# Patient Record
Sex: MALE | Race: WHITE | Employment: FULL TIME | URBAN - NONMETROPOLITAN AREA
[De-identification: names, ages, dates, MRNs, and addresses within clinical notes are randomized per-mention and may not be internally consistent; named-entity substitution may affect disease eponyms.]

---

## 2019-07-24 ENCOUNTER — TELEPHONE (OUTPATIENT)
Dept: OTOLARYNGOLOGY | Age: 54
End: 2019-07-24

## 2019-09-17 ENCOUNTER — OFFICE VISIT (OUTPATIENT)
Dept: OTOLARYNGOLOGY | Facility: CLINIC | Age: 54
End: 2019-09-17

## 2019-09-17 ENCOUNTER — PROCEDURE VISIT (OUTPATIENT)
Dept: OTOLARYNGOLOGY | Facility: CLINIC | Age: 54
End: 2019-09-17

## 2019-09-17 VITALS
HEART RATE: 68 BPM | RESPIRATION RATE: 20 BRPM | BODY MASS INDEX: 32.97 KG/M2 | SYSTOLIC BLOOD PRESSURE: 128 MMHG | OXYGEN SATURATION: 100 % | HEIGHT: 78 IN | TEMPERATURE: 98.5 F | WEIGHT: 285 LBS | DIASTOLIC BLOOD PRESSURE: 68 MMHG

## 2019-09-17 DIAGNOSIS — H90.A22 SENSORINEURAL HEARING LOSS (SNHL) OF LEFT EAR WITH RESTRICTED HEARING OF RIGHT EAR: Primary | ICD-10-CM

## 2019-09-17 DIAGNOSIS — H93.12 TINNITUS, LEFT: ICD-10-CM

## 2019-09-17 DIAGNOSIS — H83.3X2 ACOUSTIC TRAUMA OF LEFT EAR: ICD-10-CM

## 2019-09-17 PROCEDURE — 92570 ACOUSTIC IMMITANCE TESTING: CPT | Performed by: AUDIOLOGIST-HEARING AID FITTER

## 2019-09-17 PROCEDURE — 99203 OFFICE O/P NEW LOW 30 MIN: CPT | Performed by: NURSE PRACTITIONER

## 2019-09-17 PROCEDURE — 92557 COMPREHENSIVE HEARING TEST: CPT | Performed by: AUDIOLOGIST-HEARING AID FITTER

## 2019-09-17 RX ORDER — ERTUGLIFLOZIN 15 MG/1
15 TABLET, FILM COATED ORAL DAILY
Refills: 3 | COMMUNITY
Start: 2019-09-06

## 2019-09-17 RX ORDER — TADALAFIL 5 MG/1
TABLET ORAL
COMMUNITY
Start: 2015-05-28

## 2019-09-17 RX ORDER — PHENTERMINE HYDROCHLORIDE 30 MG/1
30 CAPSULE ORAL DAILY
Refills: 0 | COMMUNITY
Start: 2019-07-29

## 2019-09-17 RX ORDER — SITAGLIPTIN AND METFORMIN HYDROCHLORIDE 500; 50 MG/1; MG/1
1 TABLET, FILM COATED ORAL 2 TIMES DAILY WITH MEALS
Refills: 2 | COMMUNITY
Start: 2019-07-09

## 2019-09-17 RX ORDER — TOPIRAMATE 100 MG/1
1 CAPSULE, EXTENDED RELEASE ORAL
Refills: 3 | COMMUNITY
Start: 2019-09-06

## 2019-10-01 PROBLEM — H90.A22 SENSORINEURAL HEARING LOSS (SNHL) OF LEFT EAR WITH RESTRICTED HEARING OF RIGHT EAR: Status: ACTIVE | Noted: 2019-10-01

## 2019-10-01 PROBLEM — H83.3X2 ACOUSTIC TRAUMA OF LEFT EAR: Status: ACTIVE | Noted: 2019-10-01

## 2019-10-01 PROBLEM — H93.12 TINNITUS, LEFT: Status: ACTIVE | Noted: 2019-10-01

## 2024-06-27 ENCOUNTER — OFFICE VISIT (OUTPATIENT)
Dept: GASTROENTEROLOGY | Facility: CLINIC | Age: 59
End: 2024-06-27
Payer: COMMERCIAL

## 2024-06-27 VITALS
HEART RATE: 75 BPM | WEIGHT: 260 LBS | TEMPERATURE: 97.5 F | DIASTOLIC BLOOD PRESSURE: 74 MMHG | SYSTOLIC BLOOD PRESSURE: 134 MMHG | BODY MASS INDEX: 36.4 KG/M2 | OXYGEN SATURATION: 99 % | HEIGHT: 71 IN

## 2024-06-27 DIAGNOSIS — Z86.010 HX OF COLONIC POLYP: ICD-10-CM

## 2024-06-27 DIAGNOSIS — Z12.11 ENCOUNTER FOR SCREENING FOR MALIGNANT NEOPLASM OF COLON: Primary | ICD-10-CM

## 2024-06-27 RX ORDER — DAPAGLIFLOZIN AND METFORMIN HYDROCHLORIDE 10; 500 MG/1; MG/1
1 TABLET, FILM COATED, EXTENDED RELEASE ORAL DAILY
COMMUNITY

## 2024-06-28 PROBLEM — Z12.11 ENCOUNTER FOR SCREENING FOR MALIGNANT NEOPLASM OF COLON: Status: ACTIVE | Noted: 2024-06-27

## 2024-07-22 ENCOUNTER — ANESTHESIA (OUTPATIENT)
Dept: GASTROENTEROLOGY | Facility: HOSPITAL | Age: 59
End: 2024-07-22
Payer: COMMERCIAL

## 2024-07-22 ENCOUNTER — HOSPITAL ENCOUNTER (OUTPATIENT)
Facility: HOSPITAL | Age: 59
Setting detail: HOSPITAL OUTPATIENT SURGERY
Discharge: HOME | End: 2024-07-22
Attending: INTERNAL MEDICINE | Admitting: INTERNAL MEDICINE
Payer: COMMERCIAL

## 2024-07-22 ENCOUNTER — ANESTHESIA EVENT (OUTPATIENT)
Dept: GASTROENTEROLOGY | Facility: HOSPITAL | Age: 59
End: 2024-07-22
Payer: COMMERCIAL

## 2024-07-22 VITALS
HEIGHT: 71 IN | WEIGHT: 246 LBS | HEART RATE: 81 BPM | OXYGEN SATURATION: 95 % | BODY MASS INDEX: 34.44 KG/M2 | DIASTOLIC BLOOD PRESSURE: 84 MMHG | SYSTOLIC BLOOD PRESSURE: 125 MMHG | TEMPERATURE: 96.8 F | RESPIRATION RATE: 23 BRPM

## 2024-07-22 DIAGNOSIS — Z12.11 ENCOUNTER FOR SCREENING FOR MALIGNANT NEOPLASM OF COLON: ICD-10-CM

## 2024-07-22 LAB — GLUCOSE BLDC GLUCOMTR-MCNC: 147 MG/DL (ref 70–130)

## 2024-07-22 PROCEDURE — 25810000003 SODIUM CHLORIDE 0.9 % SOLUTION: Performed by: ANESTHESIOLOGY

## 2024-07-22 PROCEDURE — 82948 REAGENT STRIP/BLOOD GLUCOSE: CPT

## 2024-07-22 PROCEDURE — 25010000002 PROPOFOL 10 MG/ML EMULSION

## 2024-07-22 PROCEDURE — 88305 TISSUE EXAM BY PATHOLOGIST: CPT | Performed by: INTERNAL MEDICINE

## 2024-07-22 PROCEDURE — 45385 COLONOSCOPY W/LESION REMOVAL: CPT | Performed by: INTERNAL MEDICINE

## 2024-07-22 RX ORDER — ONDANSETRON 2 MG/ML
4 INJECTION INTRAMUSCULAR; INTRAVENOUS ONCE AS NEEDED
Status: DISCONTINUED | OUTPATIENT
Start: 2024-07-22 | End: 2024-07-22 | Stop reason: HOSPADM

## 2024-07-22 RX ORDER — LIDOCAINE HYDROCHLORIDE 10 MG/ML
0.5 INJECTION, SOLUTION EPIDURAL; INFILTRATION; INTRACAUDAL; PERINEURAL ONCE AS NEEDED
Status: CANCELLED | OUTPATIENT
Start: 2024-07-22

## 2024-07-22 RX ORDER — SODIUM CHLORIDE 0.9 % (FLUSH) 0.9 %
10 SYRINGE (ML) INJECTION AS NEEDED
Status: DISCONTINUED | OUTPATIENT
Start: 2024-07-22 | End: 2024-07-22 | Stop reason: HOSPADM

## 2024-07-22 RX ORDER — PROPOFOL 10 MG/ML
VIAL (ML) INTRAVENOUS AS NEEDED
Status: DISCONTINUED | OUTPATIENT
Start: 2024-07-22 | End: 2024-07-22 | Stop reason: SURG

## 2024-07-22 RX ORDER — LIDOCAINE HYDROCHLORIDE 20 MG/ML
INJECTION, SOLUTION EPIDURAL; INFILTRATION; INTRACAUDAL; PERINEURAL AS NEEDED
Status: DISCONTINUED | OUTPATIENT
Start: 2024-07-22 | End: 2024-07-22 | Stop reason: SURG

## 2024-07-22 RX ORDER — SODIUM CHLORIDE 9 MG/ML
500 INJECTION, SOLUTION INTRAVENOUS CONTINUOUS PRN
Status: DISCONTINUED | OUTPATIENT
Start: 2024-07-22 | End: 2024-07-22 | Stop reason: HOSPADM

## 2024-07-22 RX ADMIN — PROPOFOL 250 MG: 10 INJECTION, EMULSION INTRAVENOUS at 12:30

## 2024-07-22 RX ADMIN — SODIUM CHLORIDE 500 ML: 9 INJECTION, SOLUTION INTRAVENOUS at 11:18

## 2024-07-22 RX ADMIN — LIDOCAINE HYDROCHLORIDE 50 MG: 20 INJECTION, SOLUTION EPIDURAL; INFILTRATION; INTRACAUDAL; PERINEURAL at 12:30

## 2024-07-23 LAB
CYTO UR: NORMAL
LAB AP CASE REPORT: NORMAL
Lab: NORMAL
PATH REPORT.FINAL DX SPEC: NORMAL
PATH REPORT.GROSS SPEC: NORMAL

## 2025-07-23 ENCOUNTER — OFFICE VISIT (OUTPATIENT)
Dept: NEUROLOGY | Facility: CLINIC | Age: 60
End: 2025-07-23
Payer: COMMERCIAL

## 2025-07-23 VITALS
WEIGHT: 246 LBS | BODY MASS INDEX: 34.44 KG/M2 | HEART RATE: 90 BPM | HEIGHT: 71 IN | OXYGEN SATURATION: 96 % | DIASTOLIC BLOOD PRESSURE: 74 MMHG | SYSTOLIC BLOOD PRESSURE: 126 MMHG

## 2025-07-23 DIAGNOSIS — G25.0 ESSENTIAL TREMOR: Primary | ICD-10-CM

## 2025-07-23 RX ORDER — PRIMIDONE 50 MG/1
50 TABLET ORAL NIGHTLY
Qty: 30 TABLET | Refills: 1 | Status: SHIPPED | OUTPATIENT
Start: 2025-07-23

## 2025-07-23 RX ORDER — GLIMEPIRIDE 2 MG/1
TABLET ORAL
COMMUNITY
Start: 2025-06-26

## 2025-07-23 RX ORDER — TIRZEPATIDE 5 MG/.5ML
INJECTION, SOLUTION SUBCUTANEOUS
COMMUNITY
Start: 2025-07-05

## 2025-07-23 NOTE — PROGRESS NOTES
Neurology Consult Note     [facility]  [address], [address]  [place] [address]  Phone: [phone]  Fax: [phone]    Referring Provider:   STEPHEN Hurley  Primary Care Provider:  Abhi Abraham APRN    Reason for Consult:  Tremor  Subjective      History of Present Illness  59-year-old male referred to our clinic by STEPHEN Allen for the evaluation of tremor.    Today patient presents by himself.  He is right-hand dominant.  Reports to me a tremor that started in his right hand and gradually progressed to his left hand with an onset about 5 years that has slowly worsened.  Originally he thought it was due to stress from his work environment.  However he changed jobs with less stress and still has the tremor.  He may notice slightly better however.  He can have flares of worsening tremor.  He typically notes his tremor with activity such as picking up objects, eating from a spoon or drinking.  His handwriting has also become affected.  He also states pistols and notes he has trouble with that as well.  He does not consider this tremor disabling however it can be aggravating.    He does endorse a history of head injury in the eighth grade as well as high school.  Both these resulted in a concussion.  He does have a family history of tremors in his sister.  He notes her tremors occur when she is carrying a plate.    He was previously tried on propranolol by his PCP.  He was on 60 mg daily.  He notes this made him feel very sluggish and tired.  He stopped therapy after 3 weeks.  He has not been tried any other medications for this.  He also has not noticed if this improves with alcohol.  Patient Active Problem List   Diagnosis    Sensorineural hearing loss (SNHL) of left ear with restricted hearing of right ear    Tinnitus, left    Acoustic trauma of left ear    Encounter for screening for malignant neoplasm of colon        Past Medical History:   Diagnosis Date    BPH  "(benign prostatic hyperplasia)     Colon polyp     Diabetes mellitus     Essential tremor     Headache, tension-type 2000    HL (hearing loss) 2020    Migraine     Sleep apnea     cpap at hs        Social History     Socioeconomic History    Marital status:    Tobacco Use    Smoking status: Never    Smokeless tobacco: Current     Types: Snuff   Vaping Use    Vaping status: Never Used   Substance and Sexual Activity    Alcohol use: Not Currently     Alcohol/week: 1.0 standard drink of alcohol     Types: 1 Cans of beer per week     Comment: occ    Drug use: Never    Sexual activity: Yes     Partners: Female     Birth control/protection: Vasectomy        No Known Allergies       Current Outpatient Medications:     dapagliflozin-metformin HCl ER (Xigduo XR)  MG tablet sustained-release 24 hour tablet, Take 1 tablet by mouth Daily., Disp: , Rfl:     glimepiride (AMARYL) 2 MG tablet, , Disp: , Rfl:     glipizide (GLUCOTROL XL) 2.5 MG 24 hr tablet, Take 1 tablet by mouth Daily., Disp: , Rfl:     Mounjaro 5 MG/0.5ML solution auto-injector, , Disp: , Rfl:     tadalafil (CIALIS) 5 MG tablet, , Disp: , Rfl:     JANUMET  MG per tablet, Take 1 tablet by mouth 2 (Two) Times a Day With Meals., Disp: , Rfl: 2    primidone (MYSOLINE) 50 MG tablet, Take 1 tablet by mouth Every Night. Take 1/2 tablet nightly for 7 days. Then increase to 1 tablet nightly., Disp: 30 tablet, Rfl: 1    propranolol LA (INDERAL LA) 60 MG 24 hr capsule, Take 1 capsule by mouth Daily., Disp: , Rfl:     Tirzepatide (Mounjaro) 2.5 MG/0.5ML solution pen-injector pen, Inject 0.5 mL under the skin into the appropriate area as directed 1 (One) Time Per Week., Disp: , Rfl:        Objective   Vital Signs:   /74   Pulse 90   Ht 180.3 cm (71\")   Wt 112 kg (246 lb)   SpO2 96%   BMI 34.31 kg/m²       Physical Exam  Vitals and nursing note reviewed.   Constitutional:       Appearance: Normal appearance.   HENT:      Head: Normocephalic. "   Eyes:      General: Lids are normal.      Extraocular Movements: Extraocular movements intact.      Pupils: Pupils are equal, round, and reactive to light.   Pulmonary:      Effort: Pulmonary effort is normal. No respiratory distress.   Skin:     General: Skin is warm and dry.   Neurological:      Mental Status: He is alert.      Motor: Motor strength is normal.     Deep Tendon Reflexes: Reflexes are normal and symmetric.   Psychiatric:         Speech: Speech normal.        Neurological Exam  Mental Status  Alert. Oriented to person, place, time and situation. Speech is normal. Language is fluent with no aphasia.    Cranial Nerves  CN II: Visual fields full to confrontation.  CN III, IV, VI: Extraocular movements intact bilaterally. Normal lids and orbits bilaterally. Pupils equal round and reactive to light bilaterally.  CN V: Facial sensation is normal.  CN VII: Full and symmetric facial movement.  CN IX, X: Palate elevates symmetrically. Normal gag reflex.  CN XI: Shoulder shrug strength is normal.  CN XII: Tongue midline without atrophy or fasciculations.    Motor   The following abnormal movements were seen: No resting tremor.  Mild frequency and amplitude tremor with outstretched arms and bird wing position. No rigidity. No bradykinesia. .   Strength is 5/5 throughout all four extremities.    Sensory  Light touch is normal in upper and lower extremities.     Reflexes  Deep tendon reflexes are 2+ and symmetric in all four extremities.    Gait  Casual gait is normal including stance, stride, and arm swing.      Result Review :   The following data was reviewed by: STEPHEN Jacinto on 07/23/2025:       PROGRESS NOTES - SCAN - PROGRESS NOTE_[facility]_6-26-25 (06/26/2025)                Impression:  Luca Jacob is a 59 y.o. male who presents for evaluation of tremor.  After evaluating patient, I do believe his tremor represents an essential tremor.  He has no signs of Parkinson disease  or parkinsonism.  He has tried propranolol with negative effects of feeling sluggish and tired.  I would recommend a trial of primidone.  Discussed with the patient potential side effects to include sluggishness as well as cognitive effects.  He is open to trying this for 6 weeks.  No workup indicated.    Diagnoses and all orders for this visit:    1. Essential tremor (Primary)  -     primidone (MYSOLINE) 50 MG tablet; Take 1 tablet by mouth Every Night. Take 1/2 tablet nightly for 7 days. Then increase to 1 tablet nightly.  Dispense: 30 tablet; Refill: 1        Plan:  Initiate primidone 50 mg tablet  Take half a tablet nightly for 1 week then increase to 1 tablet nightly  Contact our office any worsening symptoms  Follow-up with primary care as scheduled  Follow-up in my clinic 6 weeks or sooner if needed    The patient and I have discussed the plan of care and he is in full agreement at this time.     Follow Up   Return in about 6 weeks (around 9/3/2025) for Tremor.            Ave Smith, STEPHEN  07/23/25  14:23 CDT

## (undated) DEVICE — THE SINGLE USE ETRAP – POLYP TRAP IS USED FOR SUCTION RETRIEVAL OF ENDOSCOPICALLY REMOVED POLYPS.: Brand: ETRAP

## (undated) DEVICE — CUFF,BP,DISP,1 TUBE,ADULT,HP: Brand: MEDLINE

## (undated) DEVICE — Device: Brand: SINGLE USE ELECTROSURGICAL SNARE SD-400

## (undated) DEVICE — YANKAUER,BULB TIP WITH VENT: Brand: ARGYLE

## (undated) DEVICE — THE CHANNEL CLEANING BRUSH IS A NYLON FLEXI BRUSH ATTACHED TO A FLEXIBLE PLASTIC SHEATH DESIGNED TO SAFELY REMOVE DEBRIS FROM FLEXIBLE ENDOSCOPES.

## (undated) DEVICE — SENSR O2 OXIMAX FNGR A/ 18IN NONSTR

## (undated) DEVICE — MASK,OXYGEN,MED CONC,ADLT,7' TUB, UC: Brand: PENDING

## (undated) DEVICE — Device: Brand: DEFENDO AIR/WATER/SUCTION AND BIOPSY VALVE